# Patient Record
Sex: MALE | Race: OTHER | HISPANIC OR LATINO | URBAN - METROPOLITAN AREA
[De-identification: names, ages, dates, MRNs, and addresses within clinical notes are randomized per-mention and may not be internally consistent; named-entity substitution may affect disease eponyms.]

---

## 2019-04-08 ENCOUNTER — EMERGENCY (EMERGENCY)
Facility: HOSPITAL | Age: 49
LOS: 1 days | Discharge: HOME | End: 2019-04-08
Admitting: EMERGENCY MEDICINE
Payer: COMMERCIAL

## 2019-04-08 VITALS
SYSTOLIC BLOOD PRESSURE: 132 MMHG | OXYGEN SATURATION: 96 % | DIASTOLIC BLOOD PRESSURE: 83 MMHG | HEART RATE: 84 BPM | TEMPERATURE: 99 F | WEIGHT: 229.94 LBS | RESPIRATION RATE: 17 BRPM

## 2019-04-08 VITALS
OXYGEN SATURATION: 96 % | HEART RATE: 77 BPM | TEMPERATURE: 98 F | RESPIRATION RATE: 18 BRPM | DIASTOLIC BLOOD PRESSURE: 81 MMHG | SYSTOLIC BLOOD PRESSURE: 135 MMHG
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
## 2019-04-08 DIAGNOSIS — S01.81XA LACERATION WITHOUT FOREIGN BODY OF OTHER PART OF HEAD, INITIAL ENCOUNTER: ICD-10-CM
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
## 2019-04-08 DIAGNOSIS — M54.9 DORSALGIA, UNSPECIFIED: ICD-10-CM

## 2019-04-08 DIAGNOSIS — S06.0X9A CONCUSSION WITH LOSS OF CONSCIOUSNESS OF UNSPECIFIED DURATION, INITIAL ENCOUNTER: ICD-10-CM

## 2019-04-08 DIAGNOSIS — Z23 ENCOUNTER FOR IMMUNIZATION: ICD-10-CM

## 2019-04-08 DIAGNOSIS — S00.03XA CONTUSION OF SCALP, INITIAL ENCOUNTER: ICD-10-CM

## 2019-04-08 PROCEDURE — 99284 EMERGENCY DEPT VISIT MOD MDM: CPT

## 2019-04-08 PROCEDURE — 73080 X-RAY EXAM OF ELBOW: CPT | Mod: 26,LT

## 2019-04-08 PROCEDURE — 72100 X-RAY EXAM L-S SPINE 2/3 VWS: CPT | Mod: 26

## 2019-04-08 PROCEDURE — 70450 CT HEAD/BRAIN W/O DYE: CPT | Mod: 26

## 2019-04-08 RX ORDER — TETANUS TOXOID, REDUCED DIPHTHERIA TOXOID AND ACELLULAR PERTUSSIS VACCINE, ADSORBED 5; 2.5; 8; 8; 2.5 [IU]/.5ML; [IU]/.5ML; UG/.5ML; UG/.5ML; UG/.5ML
0.5 SUSPENSION INTRAMUSCULAR ONCE
Qty: 0 | Refills: 0 | Status: COMPLETED | OUTPATIENT
Start: 2019-04-08 | End: 2019-04-08

## 2019-04-08 RX ORDER — IBUPROFEN 200 MG
1 TABLET ORAL
Qty: 28 | Refills: 0 | OUTPATIENT
Start: 2019-04-08 | End: 2019-04-14

## 2019-04-08 RX ORDER — IBUPROFEN 200 MG
600 TABLET ORAL ONCE
Qty: 0 | Refills: 0 | Status: COMPLETED | OUTPATIENT
Start: 2019-04-08 | End: 2019-04-08

## 2019-04-08 RX ADMIN — Medication 600 MILLIGRAM(S): at 15:17

## 2019-04-08 RX ADMIN — Medication 1 TABLET(S): at 15:17

## 2019-04-08 RX ADMIN — TETANUS TOXOID, REDUCED DIPHTHERIA TOXOID AND ACELLULAR PERTUSSIS VACCINE, ADSORBED 0.5 MILLILITER(S): 5; 2.5; 8; 8; 2.5 SUSPENSION INTRAMUSCULAR at 11:15

## 2019-04-08 NOTE — ED PROVIDER NOTE - NSFOLLOWUPINSTRUCTIONS_ED_ALL_ED_FT
Please follow up with Dr. Latham in 10 days for suture removal  Take Augmentin as prescribed  Take Motrin as needed for pain, take with food.    Follow up with Orthopedics this week as needed  Rest. No heavy lifting    RETURN TO THE EMERGENCY DEPARTMENT FOR WORSENING PAIN, CONFUSION, OR ANY CONCERNING OR WORSENING SYMPTOMS.

## 2019-04-08 NOTE — ED PROVIDER NOTE - CARE PROVIDERS DIRECT ADDRESSES
,DirectAddress_Unknown,[email or web address]

## 2019-04-08 NOTE — ED PROVIDER NOTE - PHYSICAL EXAMINATION
VITAL SIGNS: I have reviewed nursing notes and confirm.  CONSTITUTIONAL: Well-developed; well-nourished; in no acute distress.  SKIN: Skin is warm and dry, no acute rash.  HEAD: Normocephalic; atraumatic.  EYES: PERRL, EOM intact; conjunctiva and sclera clear.  ENT: No nasal discharge; airway clear.  NECK: Supple; non tender.  CARD: S1, S2 normal; no murmurs, gallops, or rubs. Regular rate and rhythm.  RESP: No wheezes, rales or rhonchi.  ABD: Normal bowel sounds; soft; non-distended; non-tender; no hepatosplenomegaly.  MSK: Normal ROM. No clubbing, cyanosis or edema.  NEURO: Alert, oriented. Grossly unremarkable.  PSYCH: Cooperative, appropriate. VITAL SIGNS: I have reviewed nursing notes and confirm.  CONSTITUTIONAL: Well-developed; well-nourished; in no acute distress.  SKIN: Laceration to chin.   HEAD: Hematoma to right scalp.   EYES: PERRL, EOM intact.  ENT: Airway clear.  NECK: Supple; non tender.  CARD: S1, S2 normal; no murmurs, gallops, or rubs. Regular rate and rhythm.  RESP: No wheezes, rales or rhonchi.  ABD: Normal bowel sounds; soft; non-distended; non-tender; no hepatosplenomegaly.  MSK: Normal ROM. No clubbing, cyanosis or edema.  NEURO: Alert, oriented. Grossly unremarkable.  PSYCH: Cooperative, appropriate. VITAL SIGNS: I have reviewed nursing notes and confirm.  CONSTITUTIONAL: Well-developed; well-nourished; in no acute distress.  SKIN: Laceration to chin 2cm deep to bone  HEAD: Hematoma to right scalp.   EYES: PERRL, EOM intact.  ENT: Airway clear.  NECK: Supple; non tender.  CARD: S1, S2 normal; no murmurs, gallops, or rubs. Regular rate and rhythm.  RESP: No wheezes, rales or rhonchi.  ABD:  soft; non-distended; non-tender;  MSK: Normal ROM x 4. ambulatory  NEURO: Patient is alert, oriented x person, place and time.  Cranial nerves 2-12 are intact.  Normal gait and speech.  Cerebellar testing normal:  negative Romberg, normal coordination and normal finger to nose, heal to shin and rapid alternating movements.  Normal sensory exam throughout.  No pronator drift.  5/5 bl upper extremity and lower extremity strength. Visual fields intact   PSYCH: Cooperative, appropriate.

## 2019-04-08 NOTE — ED PROVIDER NOTE - CLINICAL SUMMARY MEDICAL DECISION MAKING FREE TEXT BOX
s/p fall at work with hematoma to scalp, no neuro/motor deficits, .lac to chin repaired by plastics on call dr. miller, tetanus updated, rx augmentin. f/u outpatient with dr miller in 10 days for suture removal. brain rest. LS spine xrays prelim neg, L elbow xrays prelim neg, advised cool compresses, rest, f/u ortho. return precautions discussed.

## 2019-04-08 NOTE — ED ADULT NURSE NOTE - NSIMPLEMENTINTERV_GEN_ALL_ED
Implemented All Fall Risk Interventions:  [place] to call system. Call bell, personal items and telephone within reach. Instruct patient to call for assistance. Room bathroom lighting operational. Non-slip footwear when patient is off stretcher. Physically safe environment: no spills, clutter or unnecessary equipment. Stretcher in lowest position, wheels locked, appropriate side rails in place. Provide visual cue, wrist band, yellow gown, etc. Monitor gait and stability. Monitor for mental status changes and reorient to person, place, and time. Review medications for side effects contributing to fall risk. Reinforce activity limits and safety measures with patient and family.

## 2019-04-08 NOTE — ED ADULT NURSE NOTE - CHIEF COMPLAINT QUOTE
Pt presents to ED with complaints of fall at work. Pt was lifting metal door with machine when he fell backwards and hit head. Injury sustained to chin from door. Pt reports LOC for several seconds. Pt reports pain to back of head and lower back. Denies visual changes, N/V, numbness, tingling, neck pain. Tetanus unknown.

## 2019-04-08 NOTE — ED PROVIDER NOTE - CARE PLAN
Principal Discharge DX:	Chin laceration  Secondary Diagnosis:	Head injury  Secondary Diagnosis:	Fall

## 2019-04-08 NOTE — ED ADULT TRIAGE NOTE - CHIEF COMPLAINT QUOTE
c/o right foot pain due to "bunion" Pt presents to ED with complaints of fall at work. Pt was lifting metal door with machine when he fell backwards and hit head. Injury sustained to chin from door. Pt reports LOC for several seconds. Pt reports pain to back of head and lower back. Denies visual changes, N/V, numbness, tingling, neck pain. Tetanus unknown.

## 2019-04-08 NOTE — ED PROVIDER NOTE - CARE PROVIDER_API CALL
Ellen Latham)  Plastic Surgery; Surgery  [address]  [address]  [place] [address]  Phone: [phone]  Fax: [phone]  Follow Up Time: 7-10 Days    Sylvain Vasquez)  Orthopaedic Surgery  [address] [place] [address]  Phone: [phone]  Fax: [phone]  Follow Up Time:

## 2019-04-08 NOTE — ED PROVIDER NOTE - OBJECTIVE STATEMENT
49 y/o male with no pertinent PMHx, NKDA, presents to the ED c/o injuries s/p fall today. Patient reports he was at work today when a heavy door he was lifting with a machine hit him from under his chin, causing him to fall backwards. Pt reports he stood up immediately after the incident but then had +LOC for a few minutes. He presents with a laceration under his chin and notes generalized lower back pain.He denies neck pain, no CP, no SOB, no abd pain. 47 y/o male with no pertinent PMHx, NKDA, presents to the ED c/o injuries s/p fall today. Patient reports he was at work today when a heavy door he was lifting with a machine hit him from under his chin, causing him to fall backwards. Pt reports he stood up immediately after the incident but then had +LOC for a few minutes. He presents with a laceration under his chin and notes generalized lower back pain. He denies neck pain, no CP, no SOB, no abd pain. 47 y/o male with no pertinent PMHx, NKDA, presents to the ED c/o injuries s/p fall today. Patient reports he was at work today when a heavy door he was lifting with a machine hit him from under his chin, causing him to fall backwards. Pt reports he stood up immediately after the incident but then had +LOC. He presents with a laceration under his chin and notes generalized lower back pain. He denies neck pain, no CP, no SOB, no abd pain. 47 y/o male with no pertinent PMHx, NKDA, presents to the ED c/o injuries s/p fall today. Patient reports he was at work today when a heavy door he was lifting with a machine hit him from under his chin, causing him to fall backwards. Pt reports he stood up immediately after the incident but then had +LOC. He presents with a laceration under his chin and notes generalized lower back pain. He denies neck pain, no CP, no SOB, no abd pain. Pt is unsure of last Tdap. 49 y/o male with no pertinent PMHx, NKDA, presents to the ED c/o injuries s/p fall today. Patient reports he was at work today when a heavy door he was lifting with a machine hit him from under his chin, causing him to fall backwards, hitting back of head. +LOC.  He presents with a laceration under his chin and c/o lower back pain. also c/o left elbow pain. He denies neck pain, headache. no CP, no SOB, no abd pain. Pt is unsure of last tetanus. not on blood thinners.

## 2021-07-19 PROBLEM — Z00.00 ENCOUNTER FOR PREVENTIVE HEALTH EXAMINATION: Status: ACTIVE | Noted: 2021-07-19

## 2022-08-04 ENCOUNTER — EMERGENCY (EMERGENCY)
Facility: HOSPITAL | Age: 52
LOS: 1 days | Discharge: HOME | End: 2022-08-04
Admitting: EMERGENCY MEDICINE

## 2022-08-04 VITALS
TEMPERATURE: 98 F | WEIGHT: 229.94 LBS | SYSTOLIC BLOOD PRESSURE: 122 MMHG | RESPIRATION RATE: 16 BRPM | HEIGHT: 71 IN | OXYGEN SATURATION: 97 % | DIASTOLIC BLOOD PRESSURE: 73 MMHG | HEART RATE: 82 BPM

## 2022-08-04 PROBLEM — Z78.9 OTHER SPECIFIED HEALTH STATUS: Chronic | Status: ACTIVE | Noted: 2019-04-08

## 2022-08-04 PROCEDURE — 73610 X-RAY EXAM OF ANKLE: CPT | Mod: 26,RT

## 2022-08-04 PROCEDURE — 73630 X-RAY EXAM OF FOOT: CPT | Mod: 26,RT

## 2022-08-04 PROCEDURE — 99284 EMERGENCY DEPT VISIT MOD MDM: CPT | Mod: 25

## 2022-08-04 RX ORDER — OXYCODONE AND ACETAMINOPHEN 5; 325 MG/1; MG/1
1 TABLET ORAL ONCE
Refills: 0 | Status: DISCONTINUED | OUTPATIENT
Start: 2022-08-04 | End: 2022-08-04

## 2022-08-04 RX ADMIN — OXYCODONE AND ACETAMINOPHEN 1 TABLET(S): 5; 325 TABLET ORAL at 14:32

## 2022-08-04 NOTE — ED PROVIDER NOTE - OBJECTIVE STATEMENT
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  He has not been able to bear weight since.

## 2022-08-04 NOTE — ED ADULT NURSE NOTE - OBJECTIVE STATEMENT
at work when heavy object fell on top of right foot; patient unable to bear weight on right leg; +swelling

## 2022-08-04 NOTE — ED PROVIDER NOTE - NSFOLLOWUPINSTRUCTIONS_ED_ALL_ED_FT
Contusion    A contusion is a deep bruise. Contusions are the result of a blunt injury to tissues and muscle fibers under the skin. The skin overlying the contusion may turn blue, purple, or yellow. Symptoms also include pain and swelling in the injured area.    SEEK IMMEDIATE MEDICAL CARE IF YOU HAVE ANY OF THE FOLLOWING SYMPTOMS: severe pain, numbness, tingling, pain, weakness, or skin color/temperature change in any part of your body distal to the injury.      Wound care  -  Maintain a clean and dry dressing.  If the dressing gets wet or soiled, replace the dressing as needed.  -  Return for signs of infection:  redness, swelling, colored drainage, increased pain, fever.      RICE: RICE, ICE, COMPRESS, ELEVATE  Rest and avoid use of injured area as much as possible.  Ice for 20 minutes 4-5 times per day.  Use a cloth to contain ice or ice pack.  Do not apply ice directly on the skin.    Compress area with an ace bandage or splint if possible.  Elevate area as much as possible.      You may choose to wear the ace bandage, stirrup brace and crutches for rest and stability.  You may bear weight as tolerated.  You may remove these devices for hygiene support or as pain improves.       -PLEASE FOLLOW-UP WITH YOUR PRIMARY CARE DOCTOR IN 1-2 DAYS.  BRING ALL PAPERWORK FROM TODAY'S VISIT TO YOUR FOLLOW-UP VISIT.  IF YOU DO NOT HAVE A PRIMARY CARE DOCTOR PLEASE REFER TO THE OFFICE/CLINIC INFORMATION GIVEN ABOVE.  YOU MAY ALSO CALL [phone] AND ASK FOR MS. MEI QUIROZ.  SHE CAN HELP YOU MAKE A FOLLOW-UP APPOINTMENT.  HER HOURS ARE 9AM-5PM MONDAY - FRIDAY.  -TAKE OVER THE COUNTER TYLENOL 650MG BY MOUTH EVERY 4-6 HOURS AS NEEDED FOR PAIN.  DO NOT MIX WITH ALCOHOL OR OTHER PRESCRIPTION MEDICATIONS THAT ALREADY CONTAIN TYLENOL OR ACETAMINOPHEN.   -TAKE OVER THE COUNTER IBUPROFEN 400-600MG BY MOUTH EVERY 8 HOURS AS NEEDED FOR PAIN.  BE SURE TO TAKE WITH FOOD OR MILK AS THIS MEDICATION CAN CAUSE STOMACH IRRITATION.  -PLEASE RETURN TO THE EMERGENCY DEPARTMENT IMMEDIATELY OR CALL 911 FOR ANY HIGH FEVER, TROUBLE BREATHING, VOMITING, SEVERE PAIN, OR ANY OTHER CONCERNS.      FOLLOW UP WITH ORTHOPEDICS.  CALL FOR A FOLLOW UP APPOINTMENT.

## 2022-08-04 NOTE — ED PROVIDER NOTE - CLINICAL SUMMARY MEDICAL DECISION MAKING FREE TEXT BOX
52 y/o M presents to ED c/o R ankle pain s/p injury at work.  Pt well appearing, VSS, NAD.  xray wet read negative.  pt placed in ace, stirrup brace and given crutches for support.    Results and diagnosis discussed with patient.  Treatment plan discussed, RICE, NSAIDs, WBAT.  Return precautions discussed.  Pt verbalized understanding and given the opportunity to ask questions.  Patient advised to follow up with primary care provider.

## 2022-08-04 NOTE — ED PROVIDER NOTE - CARE PROVIDER_API CALL
Meera Arguelles)  Orthopaedic Surgery Surgery  [address] [place] [address]  Phone: [phone]  Fax: [phone]  Follow Up Time:

## 2022-08-04 NOTE — ED PROVIDER NOTE - PHYSICAL EXAMINATION
Constitutional:  Well appearing, awake, alert, oriented to person, place, time/situation and in no apparent distress  Head:  NC/AT, symmetric  ENMT: Airway patent  Eyes:  Clear bilaterally, pupils equal, round   Cardiac:  Normal rate  Respiratory:  Normal respiratory rate and effort  GI:   Abd soft, non-distended  MSK:  R foot/ankle:  + edema to medial and lateral malleolus and lateral foot with ecchymosis.  Decreased ROM secondary to pain.  No achilles ttp/deformity.  + 2 pedal pulses, brisk cap refill, distal sensation intact   Neuro:  Alert and oriented  Skin:  Skin normal color for race, warm.  + ecchymosis/hematoma to lateral foot.  superficial horizontal abrasion long R lateral foot, no drainage, no fb  Psych:  Normal mood and affect, no apparent risk to self or others.

## 2022-08-04 NOTE — ED PROVIDER NOTE - PATIENT PORTAL LINK FT
You can access the FollowMyHealth Patient Portal offered by [facility] by registering at the following website: [email or web address]. By joining [facility]’s FollowMyHealth portal, you will also be able to view your health information using other applications (apps) compatible with our system.

## 2022-08-08 DIAGNOSIS — M25.571 PAIN IN RIGHT ANKLE AND JOINTS OF RIGHT FOOT: ICD-10-CM

## 2022-08-08 DIAGNOSIS — S90.31XA CONTUSION OF RIGHT FOOT, INITIAL ENCOUNTER: ICD-10-CM
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
## 2022-11-10 NOTE — ED ADULT NURSE NOTE - NS_ED_NURSE_TEACHING_TOPIC_ED_A_ED
Caller: Barbra Fernandez     Doctor: Mago Dahl    Reason for call: Work status update  Restrictions? Was she released back to work?     Fax: [phone]    Call back#: [phone] Other specify